# Patient Record
Sex: FEMALE | URBAN - NONMETROPOLITAN AREA
[De-identification: names, ages, dates, MRNs, and addresses within clinical notes are randomized per-mention and may not be internally consistent; named-entity substitution may affect disease eponyms.]

---

## 2019-06-14 ENCOUNTER — APPOINTMENT (RX ONLY)
Dept: URBAN - NONMETROPOLITAN AREA CLINIC 13 | Facility: CLINIC | Age: 49
Setting detail: DERMATOLOGY
End: 2019-06-14

## 2019-06-14 DIAGNOSIS — L82.0 INFLAMED SEBORRHEIC KERATOSIS: ICD-10-CM

## 2019-06-14 DIAGNOSIS — A63.0 ANOGENITAL (VENEREAL) WARTS: ICD-10-CM

## 2019-06-14 PROCEDURE — ? COUNSELING

## 2019-06-14 PROCEDURE — 17110 DESTRUCTION B9 LES UP TO 14: CPT

## 2019-06-14 PROCEDURE — ? LIQUID NITROGEN

## 2019-06-14 ASSESSMENT — LOCATION ZONE DERM
LOCATION ZONE: ANUS
LOCATION ZONE: CLITORIS
LOCATION ZONE: VULVA
LOCATION ZONE: TRUNK
LOCATION ZONE: TRUNK

## 2019-06-14 ASSESSMENT — LOCATION DETAILED DESCRIPTION DERM
LOCATION DETAILED: LEFT MONS PUBIS
LOCATION DETAILED: LEFT LABIUM MAJUS
LOCATION DETAILED: PERINEAL BODY
LOCATION DETAILED: PREPUCE OF CLITORIS
LOCATION DETAILED: GLUTEAL CLEFT
LOCATION DETAILED: RIGHT LABIUM MINUS
LOCATION DETAILED: RIGHT LABIUM MAJUS
LOCATION DETAILED: LEFT INFERIOR UPPER BACK

## 2019-06-14 ASSESSMENT — LOCATION SIMPLE DESCRIPTION DERM
LOCATION SIMPLE: GLUTEAL CLEFT
LOCATION SIMPLE: LABIA MINORA
LOCATION SIMPLE: LABIA MAJORA
LOCATION SIMPLE: LEFT UPPER BACK
LOCATION SIMPLE: PREPUCE OF CLITORIS
LOCATION SIMPLE: PERINEAL BODY
LOCATION SIMPLE: MONS PUBIS

## 2019-06-14 ASSESSMENT — TOTAL NUMBER OF CONDYLOMA: # OF LESIONS?: 5

## 2019-06-14 NOTE — PROCEDURE: LIQUID NITROGEN
Post-Care Instructions: I reviewed with the patient in detail post-care instructions. Patient is to wear sunprotection, and avoid picking at any of the treated lesions. Pt may apply Vaseline to crusted or scabbing areas.
Render Post-Care Instructions In Note?: no
Medical Necessity Information: It is in your best interest to select a reason for this procedure from the list below. All of these items fulfill various CMS LCD requirements except the new and changing color options.
Medical Necessity Clause: This procedure was medically necessary because the lesions that were treated were:
Detail Level: Detailed
Consent: The patient's consent was obtained including but not limited to risks of crusting, scabbing, blistering, scarring, darker or lighter pigmentary change, recurrence, incomplete removal and infection.

## 2020-04-28 ENCOUNTER — RX ONLY (OUTPATIENT)
Age: 50
Setting detail: RX ONLY
End: 2020-04-28

## 2020-04-28 RX ORDER — IMIQUIMOD 50 MG/G
CREAM TOPICAL
Qty: 1 | Refills: 2 | Status: ERX | COMMUNITY
Start: 2020-04-28